# Patient Record
Sex: MALE | Race: BLACK OR AFRICAN AMERICAN | NOT HISPANIC OR LATINO | Employment: FULL TIME | URBAN - METROPOLITAN AREA
[De-identification: names, ages, dates, MRNs, and addresses within clinical notes are randomized per-mention and may not be internally consistent; named-entity substitution may affect disease eponyms.]

---

## 2018-05-28 ENCOUNTER — OFFICE VISIT (OUTPATIENT)
Dept: FAMILY MEDICINE | Facility: CLINIC | Age: 38
End: 2018-05-28
Payer: COMMERCIAL

## 2018-05-28 ENCOUNTER — HOSPITAL ENCOUNTER (OUTPATIENT)
Dept: RADIOLOGY | Facility: HOSPITAL | Age: 38
Discharge: HOME | End: 2018-05-28
Attending: FAMILY MEDICINE
Payer: COMMERCIAL

## 2018-05-28 VITALS
HEIGHT: 73 IN | RESPIRATION RATE: 20 BRPM | SYSTOLIC BLOOD PRESSURE: 102 MMHG | BODY MASS INDEX: 30.74 KG/M2 | DIASTOLIC BLOOD PRESSURE: 74 MMHG | TEMPERATURE: 98 F | WEIGHT: 231.94 LBS

## 2018-05-28 DIAGNOSIS — Z83.3 FAMILY HISTORY OF DIABETES MELLITUS IN FATHER: ICD-10-CM

## 2018-05-28 DIAGNOSIS — R10.9 FLANK PAIN: ICD-10-CM

## 2018-05-28 DIAGNOSIS — R31.9 HEMATURIA, UNSPECIFIED TYPE: ICD-10-CM

## 2018-05-28 DIAGNOSIS — Z00.00 ANNUAL PHYSICAL EXAM: Primary | ICD-10-CM

## 2018-05-28 PROCEDURE — 99999 PR PBB SHADOW E&M-EST. PATIENT-LVL III: CPT | Mod: PBBFAC,,, | Performed by: FAMILY MEDICINE

## 2018-05-28 PROCEDURE — 99385 PREV VISIT NEW AGE 18-39: CPT | Mod: S$GLB,,, | Performed by: FAMILY MEDICINE

## 2018-05-28 PROCEDURE — 74176 CT ABD & PELVIS W/O CONTRAST: CPT | Mod: TC

## 2018-05-28 PROCEDURE — 74176 CT ABD & PELVIS W/O CONTRAST: CPT | Mod: 26,,, | Performed by: RADIOLOGY

## 2018-05-28 RX ORDER — CHOLECALCIFEROL (VITAMIN D3) 25 MCG
1000 TABLET ORAL DAILY
COMMUNITY

## 2018-05-28 RX ORDER — TRAMADOL HYDROCHLORIDE 50 MG/1
50 TABLET ORAL EVERY 6 HOURS PRN
Qty: 60 TABLET | Refills: 0 | Status: SHIPPED | OUTPATIENT
Start: 2018-05-28 | End: 2018-06-07

## 2018-05-28 NOTE — PATIENT INSTRUCTIONS
Preventing Kidney Stones  If youve had a kidney stone, you may worry that youll have another. Removing or passing your stone doesnt prevent future stones. But with your healthcare providers help, you can reduce your risk of forming new stones. Follow up with your healthcare provider to help find new stones. You may need follow-up every 3 months to a year for a lifetime.    Drink lots of water  Staying well-hydrated is the best way to reduce your risk of future stones. Drink 8 12-ounce glasses of water daily. Have 2 with each meal and 2 between meals. Try keeping a pitcher of water nearby during the day and at night.  Take medicines if needed  Medicines, including vitamins and minerals, may be prescribed for certain types of stones. You may want to write your doses and medicine times on a calendar. Some medicines decrease stone-forming chemicals in your blood. Others help prevent those chemicals from crystallizing in urine. Still others help keep a normal acid balance in your urine.  Follow your prescribed diet  Your healthcare provider will tell you which foods contain the chemicals you should avoid. Your healthcare provider may also suggest talking to a dietitian. He or she can help you plan meals youll enjoy. These meals wont put you at risk for future stones. You may be told to limit certain foods, depending on which type of stones youve had. You should limit the amount of salt in your food to about 2 grams a day. This will help prevent most types of kidney stones. Make sure you get an adequate amount of calcium in your diet.  For calcium oxalate stones: Limit animal protein, such as meat, eggs, and fish. Limit grapefruit juice and alcohol. Limit high-oxalate foods (such as cola, tea, chocolate, spinach, rhubarb, wheat bran, and peanuts).  For uric acid stones: Limit high-purine foods, such as mushrooms, peas, beans, anchovies, meat, poultry, shellfish, and organ meats. These foods increase uric acid  production.  For cystine stones: Limit high-methionine foods (fish is the most common, but eggs and meats, also). These foods increase production of cystine.  Date Last Reviewed: 2/1/2017  © 8957-1602 [employer]. [address] [place] [address]. All rights reserved. This information is not intended as a substitute for professional medical care. Always follow your healthcare professional's instructions.

## 2018-05-28 NOTE — PROGRESS NOTES
Lisa Pan is a 37 y.o. male     Chief Complaint:  Physical Exam  Source of history: Patient  Past Medical History:   Diagnosis Date    Anxiety attack      Patient  reports that he has quit smoking. He has never used smokeless tobacco. He reports that he drinks alcohol. He reports that he uses drugs, including Marijuana.  Family History   Problem Relation Age of Onset    Hypertension Mother     Diabetes Maternal Aunt     Hypertension Maternal Aunt     Diabetes Maternal Grandfather     Stroke Maternal Grandfather     Hypertension Maternal Grandfather     Heart disease Paternal Grandfather          of heart attack    Hypertension Maternal Aunt     Hypertension Maternal Aunt     Heart disease Maternal Aunt          of heart attack    Hypertension Maternal Grandmother     Diabetes Paternal Grandmother      ROS:                                                                                GENERAL: No fever, chills, fatigability or weight loss. .  SKIN: No rashes, itching or changes in color or texture of skin.  HEAD: No headaches or recent head trauma.  EYES: Visual acuity fine. No photophobia, ocular pain or diplopia.  EARS: Denies ear pain, discharge or vertigo.  NOSE: No loss of smell, no epistaxis or postnasal drip.  MOUTH & THROAT: No hoarseness or change in voice. No excessive gum bleeding.  NODES: Denies swollen glands.  CHEST: Denies GURROLA, cyanosis, wheezing, cough and sputum production.  CARDIOVASCULAR: Denies chest pain, PND, orthopnea or reduced exercise tolerance.  ABDOMEN: Appetite fine. No weight loss. Denies diarrhea, abdominal pain, hematemesis or blood in stool.  URINARY: No flank pain, dysuria or hematuria. Frequency of urination at nighttime.  PERIPHERAL VASCULAR: No claudication or cyanosis.  MUSCULOSKELETAL:  No complaints  NEUROLOGIC: No history of seizures, paralysis, alteration of gait or coordination.    OBJECTIVE:  APPEARANCE: Well nourished, well developed, in no acute  distress.   VS:  see nurses notes  5-28-18  SKIN: No lesions, good turgor, no rashes.    HEENT: TMs clear bilaterally, ear canals clear bilaterally, nasal mucosa clear bilaterally, sinuses nontender to percussion, throat mild postnasal drip.  HEAD: Normocephalic, nontender to palpation.  NECK: Supple nontender, no carotid bruits, no thyromegaly.  NODES: Normal.  CHEST: Clear bilaterally, with good respiratory excursion, no evidence of respiratory distress.  CARDIOVASCULAR: S1, S2, regular rate and rhythm without murmur.  BREASTS: No masses palpated in either breast area, or axillary area, symmetry is noted.  ABDOMEN: Soft nontender no hepatosplenomegaly, no guarding or rebound, no pulsatile mass.  GENITALIA: , testicles are normal consistency and size, no masses palpated, circumcised male, no lesions noted., no hernias noted  RECTAL: Prostate not enlarged, no masses, no rectal masses noted, Hemoccult negative.  PERIPHERAL VASCULAR: Distal pulses palpable throughout, normal capillary refill in all distal extremities, no edema.  MUSCULOSKELETAL: No abnormalities noted.  BACK: No scoliosis, no spasm, cervical, thoracic, lumbar spine nontender to palpation  NEUROLOGIC: Cranial nerves II through XII grossly intact, motor exam 5 out of 5 on distal extremities, no gait disturbances, sensation intact in all distal extremities  MENTAL STATUS: Patient oriented x3, normal affect, normal mood    ASSESSMENT/PLAN:   Lisa was seen today for annual exam.    Diagnoses and all orders for this visit:    Annual physical exam  -     CBC auto differential; Future  -     TSH; Future  -     Lipid panel; Future  -     Comprehensive metabolic panel; Future    Family history of diabetes mellitus in father  -     Hemoglobin A1c; Future    Flank pain  -     POCT URINE DIPSTICK WITHOUT MICROSCOPE  -     CT Renal Stone Study ABD Pelvis WO; Future  -     traMADol (ULTRAM) 50 mg tablet; Take 1 tablet (50 mg total) by mouth every 6 (six) hours as  needed.    Hematuria, unspecified type  -     CT Renal Stone Study ABD Pelvis WO; Future    will contact pt with results when available

## 2018-05-29 ENCOUNTER — TELEPHONE (OUTPATIENT)
Dept: FAMILY MEDICINE | Facility: CLINIC | Age: 38
End: 2018-05-29

## 2018-05-29 ENCOUNTER — LAB VISIT (OUTPATIENT)
Dept: LAB | Facility: HOSPITAL | Age: 38
End: 2018-05-29
Attending: FAMILY MEDICINE
Payer: COMMERCIAL

## 2018-05-29 DIAGNOSIS — Z00.00 ANNUAL PHYSICAL EXAM: ICD-10-CM

## 2018-05-29 DIAGNOSIS — Z83.3 FAMILY HISTORY OF DIABETES MELLITUS IN FATHER: ICD-10-CM

## 2018-05-29 DIAGNOSIS — N28.89 KIDNEY MASS: Primary | ICD-10-CM

## 2018-05-29 DIAGNOSIS — K42.9 UMBILICAL HERNIA WITHOUT OBSTRUCTION AND WITHOUT GANGRENE: ICD-10-CM

## 2018-05-29 LAB
ALBUMIN SERPL BCP-MCNC: 4.1 G/DL
ALP SERPL-CCNC: 48 U/L
ALT SERPL W/O P-5'-P-CCNC: 31 U/L
ANION GAP SERPL CALC-SCNC: 8 MMOL/L
AST SERPL-CCNC: 27 U/L
BASOPHILS # BLD AUTO: 0.02 K/UL
BASOPHILS NFR BLD: 0.5 %
BILIRUB SERPL-MCNC: 1 MG/DL
BUN SERPL-MCNC: 13 MG/DL
CALCIUM SERPL-MCNC: 9.5 MG/DL
CHLORIDE SERPL-SCNC: 108 MMOL/L
CHOLEST SERPL-MCNC: 152 MG/DL
CHOLEST/HDLC SERPL: 3.1 {RATIO}
CO2 SERPL-SCNC: 25 MMOL/L
CREAT SERPL-MCNC: 1.4 MG/DL
DIFFERENTIAL METHOD: NORMAL
EOSINOPHIL # BLD AUTO: 0.1 K/UL
EOSINOPHIL NFR BLD: 2.9 %
ERYTHROCYTE [DISTWIDTH] IN BLOOD BY AUTOMATED COUNT: 13.9 %
EST. GFR  (AFRICAN AMERICAN): >60 ML/MIN/1.73 M^2
EST. GFR  (NON AFRICAN AMERICAN): >60 ML/MIN/1.73 M^2
ESTIMATED AVG GLUCOSE: 105 MG/DL
GLUCOSE SERPL-MCNC: 104 MG/DL
HBA1C MFR BLD HPLC: 5.3 %
HCT VFR BLD AUTO: 47 %
HDLC SERPL-MCNC: 49 MG/DL
HDLC SERPL: 32.2 %
HGB BLD-MCNC: 15.2 G/DL
IMM GRANULOCYTES # BLD AUTO: 0 K/UL
IMM GRANULOCYTES NFR BLD AUTO: 0 %
LDLC SERPL CALC-MCNC: 92 MG/DL
LYMPHOCYTES # BLD AUTO: 1.9 K/UL
LYMPHOCYTES NFR BLD: 44.8 %
MCH RBC QN AUTO: 27.1 PG
MCHC RBC AUTO-ENTMCNC: 32.3 G/DL
MCV RBC AUTO: 84 FL
MONOCYTES # BLD AUTO: 0.3 K/UL
MONOCYTES NFR BLD: 7.9 %
NEUTROPHILS # BLD AUTO: 1.9 K/UL
NEUTROPHILS NFR BLD: 43.9 %
NONHDLC SERPL-MCNC: 103 MG/DL
NRBC BLD-RTO: 0 /100 WBC
PLATELET # BLD AUTO: 259 K/UL
PMV BLD AUTO: 10.6 FL
POTASSIUM SERPL-SCNC: 4.4 MMOL/L
PROT SERPL-MCNC: 7.1 G/DL
RBC # BLD AUTO: 5.61 M/UL
SODIUM SERPL-SCNC: 141 MMOL/L
TRIGL SERPL-MCNC: 55 MG/DL
TSH SERPL DL<=0.005 MIU/L-ACNC: 1.98 UIU/ML
WBC # BLD AUTO: 4.2 K/UL

## 2018-05-29 PROCEDURE — 85025 COMPLETE CBC W/AUTO DIFF WBC: CPT

## 2018-05-29 PROCEDURE — 80061 LIPID PANEL: CPT

## 2018-05-29 PROCEDURE — 80053 COMPREHEN METABOLIC PANEL: CPT

## 2018-05-29 PROCEDURE — 84443 ASSAY THYROID STIM HORMONE: CPT

## 2018-05-29 PROCEDURE — 83036 HEMOGLOBIN GLYCOSYLATED A1C: CPT

## 2018-05-29 PROCEDURE — 36415 COLL VENOUS BLD VENIPUNCTURE: CPT | Mod: PO

## 2018-05-29 NOTE — TELEPHONE ENCOUNTER
----- Message from Mell Boyce sent at 5/29/2018  9:01 AM CDT -----  Contact: pt [phone]  Pt would like call back to discuss cat scan done on 5/28

## 2018-05-29 NOTE — TELEPHONE ENCOUNTER
----- Message from Mell Boyce sent at 5/29/2018  9:01 AM CDT -----  Contact: pt [phone]  Pt would like call back to discuss cat scan done on 5/28

## 2018-05-29 NOTE — TELEPHONE ENCOUNTER
----- Message from Ivanna Bojorquezjohn sent at 5/29/2018  3:15 PM CDT -----  Just FYI, sometimes its hard to get a morning appt for this test and it requires a fasting of 8 hours.  So I scheduled it for Thursday @ 9:30 a.m., if the pt doesn't not like that date and time I will call him after Dr. Vinson speaks to him and reschedule.

## 2018-05-31 ENCOUNTER — HOSPITAL ENCOUNTER (OUTPATIENT)
Dept: RADIOLOGY | Facility: HOSPITAL | Age: 38
Discharge: HOME | End: 2018-05-31
Attending: FAMILY MEDICINE
Payer: COMMERCIAL

## 2018-05-31 DIAGNOSIS — N28.89 KIDNEY MASS: ICD-10-CM

## 2018-05-31 PROCEDURE — 76770 US EXAM ABDO BACK WALL COMP: CPT | Mod: TC

## 2018-05-31 PROCEDURE — 76770 US EXAM ABDO BACK WALL COMP: CPT | Mod: 26,,, | Performed by: RADIOLOGY

## 2018-06-07 ENCOUNTER — TELEPHONE (OUTPATIENT)
Dept: FAMILY MEDICINE | Facility: CLINIC | Age: 38
End: 2018-06-07

## 2018-06-07 DIAGNOSIS — R93.429 ABNORMAL ULTRASOUND OF KIDNEY: Primary | ICD-10-CM

## 2018-06-14 ENCOUNTER — OFFICE VISIT (OUTPATIENT)
Dept: UROLOGY | Facility: CLINIC | Age: 38
End: 2018-06-14
Payer: COMMERCIAL

## 2018-06-14 VITALS
DIASTOLIC BLOOD PRESSURE: 74 MMHG | SYSTOLIC BLOOD PRESSURE: 117 MMHG | WEIGHT: 231.94 LBS | HEIGHT: 73 IN | HEART RATE: 73 BPM | BODY MASS INDEX: 30.74 KG/M2

## 2018-06-14 DIAGNOSIS — R10.9 RIGHT FLANK PAIN: Primary | ICD-10-CM

## 2018-06-14 PROCEDURE — 3008F BODY MASS INDEX DOCD: CPT | Mod: CPTII,S$GLB,, | Performed by: UROLOGY

## 2018-06-14 PROCEDURE — 99999 PR PBB SHADOW E&M-EST. PATIENT-LVL III: CPT | Mod: PBBFAC,,, | Performed by: UROLOGY

## 2018-06-14 PROCEDURE — 99204 OFFICE O/P NEW MOD 45 MIN: CPT | Mod: S$GLB,,, | Performed by: UROLOGY

## 2018-06-14 NOTE — PROGRESS NOTES
Subjective:       Patient ID: Lisa Pan is a 37 y.o. male.    Chief Complaint: renal cyst    HPI  patient has CT scan demonstrating an abnormal area which on ultrasound turned out to be a renal cyst.  It is small and a simple cyst.    Patient states that he was told he had blood in his urine but I do not see that and today his urinalysis is totally clear.  Occasionally when he voids he has right flank discomfort but it does not sound like renal colic and there is no stones seen on CT or ultrasound     Past Medical History:   Diagnosis Date    Anxiety attack        History reviewed. No pertinent surgical history.    Family History   Problem Relation Age of Onset    Hypertension Mother     Diabetes Maternal Aunt     Hypertension Maternal Aunt     Diabetes Maternal Grandfather     Stroke Maternal Grandfather     Hypertension Maternal Grandfather     Heart disease Paternal Grandfather          of heart attack    Hypertension Maternal Aunt     Hypertension Maternal Aunt     Heart disease Maternal Aunt          of heart attack    Hypertension Maternal Grandmother     Diabetes Paternal Grandmother        Social History     Social History    Marital status: Single     Spouse name: N/A    Number of children: N/A    Years of education: N/A     Occupational History     [employer]     Social History Main Topics    Smoking status: Former Smoker    Smokeless tobacco: Never Used    Alcohol use Yes      Comment: social    Drug use: Yes     Types: Marijuana    Sexual activity: Not on file     Other Topics Concern    Not on file     Social History Narrative    No narrative on file       Allergies:  Patient has no known allergies.    Medications:    Current Outpatient Prescriptions:     multivitamin capsule, Take 1 capsule by mouth once daily., Disp: , Rfl:     TURMERIC ROOT EXTRACT ORAL, Take by mouth., Disp: , Rfl:     vitamin D 1000 units Tab, Take 1,000 Units by  mouth once daily., Disp: , Rfl:     Review of Systems   Constitutional: Negative for activity change, appetite change, chills, diaphoresis, fatigue, fever and unexpected weight change.   HENT: Negative for congestion, dental problem, hearing loss, mouth sores, postnasal drip, rhinorrhea, sinus pressure and trouble swallowing.    Eyes: Negative for pain, discharge and itching.   Respiratory: Negative for apnea, cough, choking, chest tightness, shortness of breath and wheezing.    Cardiovascular: Negative for chest pain, palpitations and leg swelling.   Gastrointestinal: Negative for abdominal distention, abdominal pain, anal bleeding, blood in stool, constipation, diarrhea, nausea, rectal pain and vomiting.   Endocrine: Negative for polydipsia and polyuria.   Genitourinary: Negative for decreased urine volume, difficulty urinating, discharge, dysuria, enuresis, flank pain, frequency, genital sores, hematuria, penile pain, penile swelling, scrotal swelling, testicular pain and urgency.   Musculoskeletal: Negative for arthralgias, back pain and myalgias.   Skin: Negative for color change, rash and wound.   Neurological: Negative for dizziness, syncope, speech difficulty, light-headedness and headaches.   Hematological: Negative for adenopathy. Does not bruise/bleed easily.   Psychiatric/Behavioral: Negative for behavioral problems, confusion, hallucinations and sleep disturbance.       Objective:      Physical Exam   Constitutional: He appears well-developed.   HENT:   Head: Normocephalic.   Cardiovascular: Normal rate.    Pulmonary/Chest: Effort normal.   Abdominal: Soft.   Genitourinary: Prostate normal.   Genitourinary Comments: 20 g benign EPS is clear   Neurological: He is alert.   Skin: Skin is warm.     Psychiatric: He has a normal mood and affect.       Assessment:       1. Right flank pain        Plan:       Lisa was seen today for renal cyst.    Diagnoses and all orders for this visit:    Right flank  pain     There is no urological evidence of pathology other than a simple right renal cyst.  No indication for further workup.  I reassured patient. Patient will follow-up with PCP

## 2018-06-14 NOTE — LETTER
June 14, 2018      Tami Arroyo MD  [address] [facility]  [address]  [facility] [address]           [facility] - Urology 4th Floor  [address]  [facility] [address]  Phone: [phone]          Patient: Lisa Pan   MR Number: [id number]   YOB: 1980   Date of Visit: 6/14/2018       Dear Dr. Tami Arroyo:    Thank you for referring Lisa Pan to me for evaluation. Attached you will find relevant portions of my assessment and plan of care.    If you have questions, please do not hesitate to call me. I look forward to following Lisa Pan along with you.    Sincerely,    Renato Valverde Jr., MD    Enclosure  CC:  No Recipients    If you would like to receive this communication electronically, please contact [email or web address] or [phone] to request more information on [employer] Link access.    For providers and/or their staff who would like to refer a patient to [facility], please contact us through our one-stop-shop provider referral line, [facility], at [phone].    If you feel you have received this communication in error or would no longer like to receive these types of communications, please e-mail [email or web address]

## 2018-10-29 ENCOUNTER — OFFICE VISIT (OUTPATIENT)
Dept: URGENT CARE | Facility: CLINIC | Age: 38
End: 2018-10-29
Payer: COMMERCIAL

## 2018-10-29 VITALS
DIASTOLIC BLOOD PRESSURE: 70 MMHG | HEIGHT: 74 IN | OXYGEN SATURATION: 98 % | WEIGHT: 218 LBS | BODY MASS INDEX: 27.98 KG/M2 | SYSTOLIC BLOOD PRESSURE: 125 MMHG | TEMPERATURE: 97 F | HEART RATE: 70 BPM | RESPIRATION RATE: 19 BRPM

## 2018-10-29 DIAGNOSIS — H10.32 ACUTE BACTERIAL CONJUNCTIVITIS OF LEFT EYE: Primary | ICD-10-CM

## 2018-10-29 PROCEDURE — 3008F BODY MASS INDEX DOCD: CPT | Mod: CPTII,S$GLB,, | Performed by: NURSE PRACTITIONER

## 2018-10-29 PROCEDURE — 99214 OFFICE O/P EST MOD 30 MIN: CPT | Mod: S$GLB,,, | Performed by: NURSE PRACTITIONER

## 2018-10-29 RX ORDER — POLYMYXIN B SULFATE AND TRIMETHOPRIM 1; 10000 MG/ML; [USP'U]/ML
1 SOLUTION OPHTHALMIC EVERY 4 HOURS
Qty: 2.8 ML | Refills: 0 | Status: SHIPPED | OUTPATIENT
Start: 2018-10-29 | End: 2018-11-05

## 2018-10-29 RX ORDER — POLYMYXIN B SULFATE AND TRIMETHOPRIM 1; 10000 MG/ML; [USP'U]/ML
1 SOLUTION OPHTHALMIC EVERY 4 HOURS
Qty: 2.8 ML | Refills: 0 | Status: SHIPPED | OUTPATIENT
Start: 2018-10-29 | End: 2018-10-29 | Stop reason: SDUPTHER

## 2018-10-29 NOTE — LETTER
October 29, 2018      [facility] [place]  [address]  [place] [address]  Phone: [phone]  Fax: [phone]       Patient: Lisa Pan   YOB: 1980  Date of Visit: 10/29/2018    To Whom It May Concern:    María Elena Pan  was at [facility] on 10/29/2018. He may return to work/school on 10/30/2018 with no restrictions. If you have any questions or concerns, or if I can be of further assistance, please do not hesitate to contact me.    Sincerely,    Eder Blum, NP

## 2018-10-30 NOTE — PROGRESS NOTES
"Subjective:       Patient ID: Lisa Pan is a 38 y.o. male.    Vitals:  height is 6' 1.5" (1.867 m) and weight is 98.9 kg (218 lb). His oral temperature is 97 °F (36.1 °C). His blood pressure is 125/70 and his pulse is 70. His respiration is 19 and oxygen saturation is 98%.     Chief Complaint: Eye Problem (left)    Eye Problem    The left eye is affected. This is a new problem. The current episode started yesterday. The problem occurs constantly. The problem has been unchanged. The injury mechanism is unknown. The pain is at a severity of 0/10. The patient is experiencing no pain. There is no known exposure to pink eye. He does not wear contacts. Associated symptoms include eye redness. Pertinent negatives include no blurred vision, eye discharge, double vision, fever, foreign body sensation, itching, nausea, photophobia, recent URI or vomiting. He has tried eye drops for the symptoms. The treatment provided no relief.     Review of Systems   Constitution: Negative for chills and fever.   HENT: Negative for congestion.    Eyes: Positive for redness. Negative for blurred vision, discharge, double vision, itching, pain and photophobia.   Gastrointestinal: Negative for nausea and vomiting.   Neurological: Negative for headaches.       Objective:      Physical Exam   Constitutional: He is oriented to person, place, and time. He appears well-developed and well-nourished.   HENT:   Head: Normocephalic and atraumatic.   Right Ear: External ear normal.   Left Ear: External ear normal.   Nose: Nose normal.   Mouth/Throat: Oropharynx is clear and moist.   Eyes: EOM and lids are normal. Pupils are equal, round, and reactive to light. Lids are everted and swept, no foreign bodies found. Left eye exhibits discharge. Left eye exhibits no chemosis, no exudate and no hordeolum. No foreign body present in the left eye. Left conjunctiva is injected.   Neck: Trachea normal, full passive range of motion without pain and phonation " normal. Neck supple.   Musculoskeletal: Normal range of motion.   Neurological: He is alert and oriented to person, place, and time.   Skin: Skin is warm, dry and intact.   Psychiatric: He has a normal mood and affect. His speech is normal and behavior is normal. Judgment and thought content normal. Cognition and memory are normal.   Nursing note and vitals reviewed.      Assessment:       1. Acute bacterial conjunctivitis of left eye        Plan:         Acute bacterial conjunctivitis of left eye  -     Discontinue: polymyxin B sulf-trimethoprim (POLYTRIM) 10,000 unit- 1 mg/mL Drop; Place 1 drop into the left eye every 4 (four) hours. for 7 days  Dispense: 2.8 mL; Refill: 0  -     polymyxin B sulf-trimethoprim (POLYTRIM) 10,000 unit- 1 mg/mL Drop; Place 1 drop into the left eye every 4 (four) hours. for 7 days  Dispense: 2.8 mL; Refill: 0      Patient Instructions       USE WARM COMPRESSES    USE DROPS IN LEFT EYE EVERY 4 HOURS WHILE AWAKE      What Is Conjunctivitis?    Conjunctivitis is an irritation or infection. It affects the membrane that covers the white of your eye and the inside of your eyelid (conjunctiva). It can happen to one or both eyes. The membrane swells and the blood vessels enlarge (dilate). This makes your eye red. That's why conjunctivitis is sometimes called red eye or pink eye.  What are the symptoms?  If you have one or more of these symptoms, see an eye doctor:  · Redness in and around your eye  · Eyes that are puffy and sore  · Itching, burning, or stinging eyes  · Watery eyes or discharge from your eye  · Eyelids that are crusty or stuck together when you wake up in the morning  · Pink color in the whites of one or both eyes  Getting treatment quickly can help prevent damage to your eyes.  How is it diagnosed?  Conjunctivitis is usually a minor eye infection. But it can sometimes become a more serious problem. Some more serious eye diseases have symptoms that look like conjunctivitis. So  it's important for an eye doctor to diagnose you. Your eye doctor will ask about your symptoms and any medicines you take. He or she will ask about any illnesses or medical conditions you may have. The doctor will also check your eyes with a hand-held light and a special microscope called a slit lamp.  Date Last Reviewed: 6/11/2015  © 1008-9888 [employer]. [address] [place] [address]. All rights reserved. This information is not intended as a substitute for professional medical care. Always follow your healthcare professional's instructions.

## 2019-02-12 ENCOUNTER — LAB VISIT (OUTPATIENT)
Dept: LAB | Facility: HOSPITAL | Age: 39
End: 2019-02-12
Attending: FAMILY MEDICINE
Payer: COMMERCIAL

## 2019-02-12 ENCOUNTER — OFFICE VISIT (OUTPATIENT)
Dept: FAMILY MEDICINE | Facility: CLINIC | Age: 39
End: 2019-02-12
Payer: COMMERCIAL

## 2019-02-12 VITALS
DIASTOLIC BLOOD PRESSURE: 70 MMHG | HEIGHT: 73 IN | HEART RATE: 62 BPM | WEIGHT: 233.25 LBS | TEMPERATURE: 98 F | SYSTOLIC BLOOD PRESSURE: 100 MMHG | BODY MASS INDEX: 30.91 KG/M2

## 2019-02-12 DIAGNOSIS — Z23 IMMUNIZATION DUE: ICD-10-CM

## 2019-02-12 DIAGNOSIS — R93.429 ABNORMAL ULTRASOUND OF KIDNEY: ICD-10-CM

## 2019-02-12 DIAGNOSIS — R35.0 FREQUENCY OF MICTURITION: ICD-10-CM

## 2019-02-12 DIAGNOSIS — R93.429 ABNORMAL ULTRASOUND OF KIDNEY: Primary | ICD-10-CM

## 2019-02-12 LAB
ALBUMIN SERPL BCP-MCNC: 4.2 G/DL
ALP SERPL-CCNC: 52 U/L
ALT SERPL W/O P-5'-P-CCNC: 39 U/L
ANION GAP SERPL CALC-SCNC: 8 MMOL/L
AST SERPL-CCNC: 32 U/L
BILIRUB SERPL-MCNC: 1.1 MG/DL
BILIRUB SERPL-MCNC: NORMAL MG/DL
BLOOD URINE, POC: NORMAL
BUN SERPL-MCNC: 14 MG/DL
CALCIUM SERPL-MCNC: 9.3 MG/DL
CHLORIDE SERPL-SCNC: 106 MMOL/L
CO2 SERPL-SCNC: 26 MMOL/L
COLOR, POC UA: YELLOW
CREAT SERPL-MCNC: 1.4 MG/DL
EST. GFR  (AFRICAN AMERICAN): >60 ML/MIN/1.73 M^2
EST. GFR  (NON AFRICAN AMERICAN): >60 ML/MIN/1.73 M^2
ESTIMATED AVG GLUCOSE: 105 MG/DL
GLUCOSE SERPL-MCNC: 97 MG/DL
GLUCOSE UR QL STRIP: NORMAL
HBA1C MFR BLD HPLC: 5.3 %
KETONES UR QL STRIP: NORMAL
LEUKOCYTE ESTERASE URINE, POC: NORMAL
NITRITE, POC UA: NORMAL
PH, POC UA: 5
POTASSIUM SERPL-SCNC: 4 MMOL/L
PROT SERPL-MCNC: 7.3 G/DL
PROTEIN, POC: NORMAL
SODIUM SERPL-SCNC: 140 MMOL/L
SPECIFIC GRAVITY, POC UA: 1.01
UROBILINOGEN, POC UA: NORMAL

## 2019-02-12 PROCEDURE — 81002 POCT URINE DIPSTICK WITHOUT MICROSCOPE: ICD-10-PCS | Mod: S$GLB,,, | Performed by: FAMILY MEDICINE

## 2019-02-12 PROCEDURE — 90471 IMMUNIZATION ADMIN: CPT | Mod: S$GLB,,, | Performed by: FAMILY MEDICINE

## 2019-02-12 PROCEDURE — 36415 COLL VENOUS BLD VENIPUNCTURE: CPT | Mod: PO

## 2019-02-12 PROCEDURE — 99999 PR PBB SHADOW E&M-EST. PATIENT-LVL III: CPT | Mod: PBBFAC,,, | Performed by: FAMILY MEDICINE

## 2019-02-12 PROCEDURE — 99214 OFFICE O/P EST MOD 30 MIN: CPT | Mod: 25,S$GLB,, | Performed by: FAMILY MEDICINE

## 2019-02-12 PROCEDURE — 99214 PR OFFICE/OUTPT VISIT, EST, LEVL IV, 30-39 MIN: ICD-10-PCS | Mod: 25,S$GLB,, | Performed by: FAMILY MEDICINE

## 2019-02-12 PROCEDURE — 3008F PR BODY MASS INDEX (BMI) DOCUMENTED: ICD-10-PCS | Mod: CPTII,S$GLB,, | Performed by: FAMILY MEDICINE

## 2019-02-12 PROCEDURE — 80053 COMPREHEN METABOLIC PANEL: CPT

## 2019-02-12 PROCEDURE — 81002 URINALYSIS NONAUTO W/O SCOPE: CPT | Mod: S$GLB,,, | Performed by: FAMILY MEDICINE

## 2019-02-12 PROCEDURE — 90715 TDAP VACCINE 7 YRS/> IM: CPT | Mod: S$GLB,,, | Performed by: FAMILY MEDICINE

## 2019-02-12 PROCEDURE — 90715 TDAP VACCINE GREATER THAN OR EQUAL TO 7YO IM: ICD-10-PCS | Mod: S$GLB,,, | Performed by: FAMILY MEDICINE

## 2019-02-12 PROCEDURE — 3008F BODY MASS INDEX DOCD: CPT | Mod: CPTII,S$GLB,, | Performed by: FAMILY MEDICINE

## 2019-02-12 PROCEDURE — 99999 PR PBB SHADOW E&M-EST. PATIENT-LVL III: ICD-10-PCS | Mod: PBBFAC,,, | Performed by: FAMILY MEDICINE

## 2019-02-12 PROCEDURE — 83036 HEMOGLOBIN GLYCOSYLATED A1C: CPT

## 2019-02-12 PROCEDURE — 90471 TDAP VACCINE GREATER THAN OR EQUAL TO 7YO IM: ICD-10-PCS | Mod: S$GLB,,, | Performed by: FAMILY MEDICINE

## 2019-02-13 NOTE — PROGRESS NOTES
Subjective:       Patient ID: Lisa Pan is a 38 y.o. male.    Chief Complaint: Results (from x-ray of back)   Patient in to follow-up on last year's CT scan and ultrasound of the arteries which showed questionable renal disease  Patient has been having some frequency of urination, no flank pain no foul-smelling urine no pain.  HPI see above  Review of Systems   Constitutional: Negative.    HENT: Negative.    Eyes: Negative.    Respiratory: Negative.    Cardiovascular: Negative.    Gastrointestinal: Negative.    Endocrine: Negative.    Genitourinary: Positive for frequency and urgency.   Musculoskeletal: Negative.    Skin: Negative.    Allergic/Immunologic: Negative.    Neurological: Negative.    Hematological: Negative.    Psychiatric/Behavioral: Negative.        Objective:      Physical Exam   Constitutional: He is oriented to person, place, and time. He appears well-developed and well-nourished. No distress.   HENT:   Head: Normocephalic and atraumatic.   Right Ear: External ear normal.   Left Ear: External ear normal.   Mouth/Throat: Oropharynx is clear and moist.   Eyes: Conjunctivae and EOM are normal. Pupils are equal, round, and reactive to light. Right eye exhibits no discharge. Left eye exhibits no discharge.   Neck: Normal range of motion. Neck supple. No JVD present. No tracheal deviation present. No thyromegaly present.   Pulmonary/Chest: Effort normal and breath sounds normal. No stridor. No respiratory distress. He has no wheezes. He has no rales.   Abdominal: Soft. Bowel sounds are normal. He exhibits no distension. There is no tenderness. There is no guarding.   Musculoskeletal: Normal range of motion. He exhibits no edema.   Neurological: He is alert and oriented to person, place, and time. He displays normal reflexes. No cranial nerve deficit or sensory deficit. He exhibits normal muscle tone. Coordination normal.   Skin: Skin is warm and dry. No rash noted. He is not diaphoretic. No  erythema. No pallor.   Psychiatric: He has a normal mood and affect. His behavior is normal. Judgment and thought content normal.   Nursing note and vitals reviewed.      Assessment:       1. Abnormal ultrasound of kidney    2. Frequency of micturition    3. Immunization due        Plan:     see orders dated 02/12/2019  Comprehensive metabolic panel          Hemoglobin A1c         POCT URINE DIPSTICK WITHOUT MICROSCOPE         Tdap Vaccine         Ultrasound renal artery stenosis hypertension complete ([facility]upid Only)         US Retroperitoneal Complete        Will contact the patient with results when available

## 2020-10-08 ENCOUNTER — PATIENT MESSAGE (OUTPATIENT)
Dept: PRIMARY CARE CLINIC | Facility: CLINIC | Age: 40
End: 2020-10-08

## 2021-01-04 ENCOUNTER — PATIENT MESSAGE (OUTPATIENT)
Dept: ADMINISTRATIVE | Facility: HOSPITAL | Age: 41
End: 2021-01-04

## 2021-04-05 ENCOUNTER — PATIENT MESSAGE (OUTPATIENT)
Dept: ADMINISTRATIVE | Facility: HOSPITAL | Age: 41
End: 2021-04-05

## 2021-04-26 ENCOUNTER — PATIENT MESSAGE (OUTPATIENT)
Dept: RESEARCH | Facility: HOSPITAL | Age: 41
End: 2021-04-26

## 2021-07-06 ENCOUNTER — PATIENT MESSAGE (OUTPATIENT)
Dept: ADMINISTRATIVE | Facility: HOSPITAL | Age: 41
End: 2021-07-06

## 2021-10-05 ENCOUNTER — PATIENT MESSAGE (OUTPATIENT)
Dept: ADMINISTRATIVE | Facility: HOSPITAL | Age: 41
End: 2021-10-05

## 2022-01-18 ENCOUNTER — PATIENT MESSAGE (OUTPATIENT)
Dept: ADMINISTRATIVE | Facility: HOSPITAL | Age: 42
End: 2022-01-18
Payer: COMMERCIAL